# Patient Record
Sex: FEMALE | Race: WHITE | NOT HISPANIC OR LATINO
[De-identification: names, ages, dates, MRNs, and addresses within clinical notes are randomized per-mention and may not be internally consistent; named-entity substitution may affect disease eponyms.]

---

## 2017-11-08 ENCOUNTER — APPOINTMENT (OUTPATIENT)
Dept: OPHTHALMOLOGY | Facility: CLINIC | Age: 82
End: 2017-11-08
Payer: MEDICARE

## 2017-11-08 PROCEDURE — 92014 COMPRE OPH EXAM EST PT 1/>: CPT

## 2018-11-07 ENCOUNTER — APPOINTMENT (OUTPATIENT)
Dept: OPHTHALMOLOGY | Facility: CLINIC | Age: 83
End: 2018-11-07
Payer: MEDICARE

## 2018-11-07 DIAGNOSIS — Z96.1 PRESENCE OF INTRAOCULAR LENS: ICD-10-CM

## 2018-11-07 DIAGNOSIS — H43.393 OTHER VITREOUS OPACITIES, BILATERAL: ICD-10-CM

## 2018-11-07 DIAGNOSIS — Z98.890 OTHER SPECIFIED POSTPROCEDURAL STATES: ICD-10-CM

## 2018-11-07 DIAGNOSIS — H26.493 OTHER SECONDARY CATARACT, BILATERAL: ICD-10-CM

## 2018-11-07 PROCEDURE — 92014 COMPRE OPH EXAM EST PT 1/>: CPT

## 2018-11-27 ENCOUNTER — TRANSCRIPTION ENCOUNTER (OUTPATIENT)
Age: 83
End: 2018-11-27

## 2018-11-27 ENCOUNTER — INPATIENT (INPATIENT)
Facility: HOSPITAL | Age: 83
LOS: 0 days | Discharge: HOME HEALTH | DRG: 605 | End: 2018-11-27
Attending: STUDENT IN AN ORGANIZED HEALTH CARE EDUCATION/TRAINING PROGRAM | Admitting: STUDENT IN AN ORGANIZED HEALTH CARE EDUCATION/TRAINING PROGRAM
Payer: COMMERCIAL

## 2018-11-27 VITALS
DIASTOLIC BLOOD PRESSURE: 60 MMHG | HEART RATE: 96 BPM | TEMPERATURE: 99 F | RESPIRATION RATE: 18 BRPM | SYSTOLIC BLOOD PRESSURE: 121 MMHG | OXYGEN SATURATION: 98 %

## 2018-11-27 VITALS
TEMPERATURE: 98 F | RESPIRATION RATE: 18 BRPM | HEART RATE: 64 BPM | WEIGHT: 119.93 LBS | OXYGEN SATURATION: 98 % | SYSTOLIC BLOOD PRESSURE: 129 MMHG | DIASTOLIC BLOOD PRESSURE: 60 MMHG

## 2018-11-27 DIAGNOSIS — E03.9 HYPOTHYROIDISM, UNSPECIFIED: ICD-10-CM

## 2018-11-27 DIAGNOSIS — Z91.89 OTHER SPECIFIED PERSONAL RISK FACTORS, NOT ELSEWHERE CLASSIFIED: ICD-10-CM

## 2018-11-27 DIAGNOSIS — D72.829 ELEVATED WHITE BLOOD CELL COUNT, UNSPECIFIED: ICD-10-CM
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
## 2018-11-27 DIAGNOSIS — R63.8 OTHER SYMPTOMS AND SIGNS CONCERNING FOOD AND FLUID INTAKE: ICD-10-CM

## 2018-11-27 DIAGNOSIS — Z29.9 ENCOUNTER FOR PROPHYLACTIC MEASURES, UNSPECIFIED: ICD-10-CM

## 2018-11-27 DIAGNOSIS — I10 ESSENTIAL (PRIMARY) HYPERTENSION: ICD-10-CM

## 2018-11-27 LAB
ALBUMIN SERPL ELPH-MCNC: 3.7 G/DL — SIGNIFICANT CHANGE UP (ref 3.3–5)
ALP SERPL-CCNC: 86 U/L — SIGNIFICANT CHANGE UP (ref 40–120)
ALT FLD-CCNC: 17 U/L — SIGNIFICANT CHANGE UP (ref 10–45)
ANION GAP SERPL CALC-SCNC: 9 MMOL/L — SIGNIFICANT CHANGE UP (ref 5–17)
APPEARANCE UR: CLEAR — SIGNIFICANT CHANGE UP
AST SERPL-CCNC: 22 U/L — SIGNIFICANT CHANGE UP (ref 10–40)
BASOPHILS NFR BLD AUTO: 0.3 % — SIGNIFICANT CHANGE UP (ref 0–2)
BILIRUB SERPL-MCNC: 0.3 MG/DL — SIGNIFICANT CHANGE UP (ref 0.2–1.2)
BILIRUB UR-MCNC: NEGATIVE — SIGNIFICANT CHANGE UP
BUN SERPL-MCNC: 12 MG/DL — SIGNIFICANT CHANGE UP (ref 7–23)
CALCIUM SERPL-MCNC: 10.3 MG/DL — SIGNIFICANT CHANGE UP (ref 8.4–10.5)
CHLORIDE SERPL-SCNC: 101 MMOL/L — SIGNIFICANT CHANGE UP (ref 96–108)
CO2 SERPL-SCNC: 30 MMOL/L — SIGNIFICANT CHANGE UP (ref 22–31)
COLOR SPEC: YELLOW — SIGNIFICANT CHANGE UP
CREAT SERPL-MCNC: 0.7 MG/DL — SIGNIFICANT CHANGE UP (ref 0.5–1.3)
DIFF PNL FLD: NEGATIVE — SIGNIFICANT CHANGE UP
EOSINOPHIL NFR BLD AUTO: 0.8 % — SIGNIFICANT CHANGE UP (ref 0–6)
GLUCOSE SERPL-MCNC: 119 MG/DL — HIGH (ref 70–99)
GLUCOSE UR QL: NEGATIVE — SIGNIFICANT CHANGE UP
HCT VFR BLD CALC: 39.2 % — SIGNIFICANT CHANGE UP (ref 34.5–45)
HGB BLD-MCNC: 12.7 G/DL — SIGNIFICANT CHANGE UP (ref 11.5–15.5)
KETONES UR-MCNC: NEGATIVE — SIGNIFICANT CHANGE UP
LACTATE SERPL-SCNC: 1.8 MMOL/L — SIGNIFICANT CHANGE UP (ref 0.5–2)
LEUKOCYTE ESTERASE UR-ACNC: ABNORMAL
LYMPHOCYTES # BLD AUTO: 8.3 % — LOW (ref 13–44)
MCHC RBC-ENTMCNC: 28.8 PG — SIGNIFICANT CHANGE UP (ref 27–34)
MCHC RBC-ENTMCNC: 32.4 G/DL — SIGNIFICANT CHANGE UP (ref 32–36)
MCV RBC AUTO: 88.9 FL — SIGNIFICANT CHANGE UP (ref 80–100)
MONOCYTES NFR BLD AUTO: 7.2 % — SIGNIFICANT CHANGE UP (ref 2–14)
NEUTROPHILS NFR BLD AUTO: 83.4 % — HIGH (ref 43–77)
NITRITE UR-MCNC: NEGATIVE — SIGNIFICANT CHANGE UP
PH UR: 7.5 — SIGNIFICANT CHANGE UP (ref 5–8)
PLATELET # BLD AUTO: 251 K/UL — SIGNIFICANT CHANGE UP (ref 150–400)
POTASSIUM SERPL-MCNC: 4.9 MMOL/L — SIGNIFICANT CHANGE UP (ref 3.5–5.3)
POTASSIUM SERPL-SCNC: 4.9 MMOL/L — SIGNIFICANT CHANGE UP (ref 3.5–5.3)
PROT SERPL-MCNC: 7.4 G/DL — SIGNIFICANT CHANGE UP (ref 6–8.3)
PROT UR-MCNC: NEGATIVE MG/DL — SIGNIFICANT CHANGE UP
RBC # BLD: 4.41 M/UL — SIGNIFICANT CHANGE UP (ref 3.8–5.2)
RBC # FLD: 15 % — SIGNIFICANT CHANGE UP (ref 10.3–16.9)
SODIUM SERPL-SCNC: 140 MMOL/L — SIGNIFICANT CHANGE UP (ref 135–145)
SP GR SPEC: 1.01 — SIGNIFICANT CHANGE UP (ref 1–1.03)
TSH SERPL-MCNC: 1.07 UIU/ML — SIGNIFICANT CHANGE UP (ref 0.35–4.94)
UROBILINOGEN FLD QL: 0.2 E.U./DL — SIGNIFICANT CHANGE UP
WBC # BLD: 14.9 K/UL — HIGH (ref 3.8–10.5)
WBC # FLD AUTO: 14.9 K/UL — HIGH (ref 3.8–10.5)

## 2018-11-27 PROCEDURE — 84443 ASSAY THYROID STIM HORMONE: CPT

## 2018-11-27 PROCEDURE — 83605 ASSAY OF LACTIC ACID: CPT

## 2018-11-27 PROCEDURE — 99285 EMERGENCY DEPT VISIT HI MDM: CPT | Mod: 25

## 2018-11-27 PROCEDURE — 87086 URINE CULTURE/COLONY COUNT: CPT

## 2018-11-27 PROCEDURE — 36415 COLL VENOUS BLD VENIPUNCTURE: CPT

## 2018-11-27 PROCEDURE — 71045 X-RAY EXAM CHEST 1 VIEW: CPT

## 2018-11-27 PROCEDURE — 70450 CT HEAD/BRAIN W/O DYE: CPT | Mod: 26

## 2018-11-27 PROCEDURE — 71045 X-RAY EXAM CHEST 1 VIEW: CPT | Mod: 26

## 2018-11-27 PROCEDURE — 12002 RPR S/N/AX/GEN/TRNK2.6-7.5CM: CPT

## 2018-11-27 PROCEDURE — 70450 CT HEAD/BRAIN W/O DYE: CPT

## 2018-11-27 PROCEDURE — 99222 1ST HOSP IP/OBS MODERATE 55: CPT | Mod: GC,AI

## 2018-11-27 PROCEDURE — 72125 CT NECK SPINE W/O DYE: CPT | Mod: 26

## 2018-11-27 PROCEDURE — 81001 URINALYSIS AUTO W/SCOPE: CPT

## 2018-11-27 PROCEDURE — 87040 BLOOD CULTURE FOR BACTERIA: CPT

## 2018-11-27 PROCEDURE — 80053 COMPREHEN METABOLIC PANEL: CPT

## 2018-11-27 PROCEDURE — 72125 CT NECK SPINE W/O DYE: CPT

## 2018-11-27 PROCEDURE — 96374 THER/PROPH/DIAG INJ IV PUSH: CPT | Mod: XU

## 2018-11-27 PROCEDURE — 85025 COMPLETE CBC W/AUTO DIFF WBC: CPT

## 2018-11-27 RX ORDER — GABAPENTIN 400 MG/1
1 CAPSULE ORAL
Qty: 0 | Refills: 0 | COMMUNITY

## 2018-11-27 RX ORDER — LEVOTHYROXINE SODIUM 125 MCG
1 TABLET ORAL
Qty: 0 | Refills: 0 | COMMUNITY

## 2018-11-27 RX ORDER — ACETAMINOPHEN 500 MG
650 TABLET ORAL ONCE
Qty: 0 | Refills: 0 | Status: COMPLETED | OUTPATIENT
Start: 2018-11-27 | End: 2018-11-27

## 2018-11-27 RX ORDER — CEFTRIAXONE 500 MG/1
1 INJECTION, POWDER, FOR SOLUTION INTRAMUSCULAR; INTRAVENOUS ONCE
Qty: 0 | Refills: 0 | Status: COMPLETED | OUTPATIENT
Start: 2018-11-27 | End: 2018-11-27

## 2018-11-27 RX ORDER — FLUTICASONE PROPIONATE AND SALMETEROL 50; 250 UG/1; UG/1
1 POWDER ORAL; RESPIRATORY (INHALATION)
Qty: 0 | Refills: 0 | COMMUNITY

## 2018-11-27 RX ORDER — AMLODIPINE BESYLATE 2.5 MG/1
1 TABLET ORAL
Qty: 0 | Refills: 0 | COMMUNITY

## 2018-11-27 RX ORDER — ZOLPIDEM TARTRATE 10 MG/1
1 TABLET ORAL
Qty: 0 | Refills: 0 | COMMUNITY

## 2018-11-27 RX ORDER — METOPROLOL TARTRATE 50 MG
1 TABLET ORAL
Qty: 0 | Refills: 0 | COMMUNITY

## 2018-11-27 RX ORDER — ATORVASTATIN CALCIUM 80 MG/1
0 TABLET, FILM COATED ORAL
Qty: 0 | Refills: 0 | COMMUNITY

## 2018-11-27 RX ORDER — ASPIRIN/CALCIUM CARB/MAGNESIUM 324 MG
1 TABLET ORAL
Qty: 0 | Refills: 0 | COMMUNITY

## 2018-11-27 RX ORDER — ATORVASTATIN CALCIUM 80 MG/1
20 TABLET, FILM COATED ORAL AT BEDTIME
Qty: 0 | Refills: 0 | Status: DISCONTINUED | OUTPATIENT
Start: 2018-11-27 | End: 2018-11-27

## 2018-11-27 RX ORDER — FUROSEMIDE 40 MG
0 TABLET ORAL
Qty: 0 | Refills: 0 | COMMUNITY

## 2018-11-27 RX ORDER — GABAPENTIN 400 MG/1
0 CAPSULE ORAL
Qty: 0 | Refills: 0 | COMMUNITY

## 2018-11-27 RX ORDER — RAMIPRIL 5 MG
1 CAPSULE ORAL
Qty: 0 | Refills: 0 | COMMUNITY

## 2018-11-27 RX ORDER — ALENDRONATE SODIUM 70 MG/1
1 TABLET ORAL
Qty: 0 | Refills: 0 | COMMUNITY

## 2018-11-27 RX ORDER — LEVOTHYROXINE SODIUM 125 MCG
0 TABLET ORAL
Qty: 0 | Refills: 0 | COMMUNITY

## 2018-11-27 RX ORDER — LEVOTHYROXINE SODIUM 125 MCG
75 TABLET ORAL DAILY
Qty: 0 | Refills: 0 | Status: DISCONTINUED | OUTPATIENT
Start: 2018-11-27 | End: 2018-11-27

## 2018-11-27 RX ORDER — FUROSEMIDE 40 MG
1 TABLET ORAL
Qty: 0 | Refills: 0 | COMMUNITY

## 2018-11-27 RX ORDER — BECLOMETHASONE DIPROPIONATE 42 MCG
2 AEROSOL, SPRAY (GRAM) NASAL
Qty: 0 | Refills: 0 | COMMUNITY

## 2018-11-27 RX ORDER — ATORVASTATIN CALCIUM 80 MG/1
1 TABLET, FILM COATED ORAL
Qty: 0 | Refills: 0 | COMMUNITY

## 2018-11-27 RX ADMIN — Medication 650 MILLIGRAM(S): at 13:53

## 2018-11-27 RX ADMIN — Medication 75 MICROGRAM(S): at 10:35

## 2018-11-27 RX ADMIN — Medication 650 MILLIGRAM(S): at 13:02

## 2018-11-27 RX ADMIN — CEFTRIAXONE 100 GRAM(S): 500 INJECTION, POWDER, FOR SOLUTION INTRAMUSCULAR; INTRAVENOUS at 06:17

## 2018-11-27 NOTE — ED PROVIDER NOTE - OBJECTIVE STATEMENT
89F with hx of htn, asthma, hypothyroidism fell out of bed and does not remember. 89F with hx of htn, asthma, hypothyroidism fell out of bed and does not remember why. Pt lives at home alone, hit her own life alert, unk if LOC but pt does not recall. 89F with hx of htn, asthma, hypothyroidism fell out of bed and does not remember why. Pt lives at home alone, hit her own life alert, unk if LOC but pt does not recall. Pt lives alone has life alert nonambulatory gets from bed to commode by herself in the middle of the night but cannot recall why she fell.

## 2018-11-27 NOTE — DISCHARGE NOTE ADULT - SECONDARY DIAGNOSIS.
HTN (hypertension) Leukocytosis Hypothyroidism, unspecified type Osteoporosis Asthma Transition of care performed with sharing of clinical summary

## 2018-11-27 NOTE — H&P ADULT - PROBLEM SELECTOR PLAN 3
On Toprol XL 50mg and Ramipril 10mg at home On Toprol XL 50mg and Ramipril 10mg at home  -Currently normotensive, will restart as necessary. Synthroid 75mcg at [facility]  -C/w home dose  -f/u TSH

## 2018-11-27 NOTE — H&P ADULT - NSHPREVIEWOFSYSTEMS_GEN_ALL_CORE
REVIEW OF SYSTEMS:    CONSTITUTIONAL: No fevers or chills  EYES/ENT: No visual or hearing changes; no throat pain or difficulty swallowing  NECK: No pain or stiffness  RESPIRATORY: No cough, wheezing; No shortness of breath  CARDIOVASCULAR: No chest pain or palpitations  GASTROINTESTINAL: No abdominal pain. No nausea, vomiting; No diarrhea or constipation.  GENITOURINARY: No dysuria, frequency or hematuria  NEUROLOGICAL: No numbness or weakness  SKIN: No itching, burning, rashes, or lesions   All other review of systems is negative unless indicated above.

## 2018-11-27 NOTE — H&P ADULT - ATTENDING COMMENTS
Pt. seen and examined by me earlier today; I have read Dr. Acosta's H&P, I agree w/ her findings and plan of care as documented; CT head reviewed; Pt. feels well, A&Ox3 in NAD, neuro exam non-focal, eating lunch at time of my visit; leukocytosis noted, likely d/t fall, no e/o acute bacterial infection; I-STOP reviewed, Pt. on Ambien, would continue w/ caution/supervision; Pt. medically-clear for d/c home w/ home care services -- see Case Mgmt notes; outpatient f/u arranged w/ Dr. Ferrari on 12/2 for staple removal

## 2018-11-27 NOTE — DISCHARGE NOTE ADULT - CARE PLAN
Principal Discharge DX:	Fall, initial encounter  Goal:	Improvement of symptoms  Assessment and plan of treatment:	You were brought into the hospital after a fall at home. You had a laceration on your scalp that was repaired with 3 staples. Please go to Dr. Ferrari on 12/4 for staple removal.   You had a head CT that did not show any abnormalities.   You were seen by PT who recommended rehab, however you prefer going home with  care and weekly PT.  Secondary Diagnosis:	HTN (hypertension)  Assessment and plan of treatment:	Preexisting condition. Continue home medications  Secondary Diagnosis:	Leukocytosis  Secondary Diagnosis:	Hypothyroidism, unspecified type  Assessment and plan of treatment:	Preexisting condition. Continue home medications. TSH was within normal limits here.  Secondary Diagnosis:	Osteoporosis  Assessment and plan of treatment:	Preexisting condition. Continue home medications  Secondary Diagnosis:	Asthma  Assessment and plan of treatment:	Preexisting condition. Continue home medications  Secondary Diagnosis:	Transition of care performed with sharing of clinical summary

## 2018-11-27 NOTE — H&P ADULT - ASSESSMENT
89 yo woman with h/o (obtained from cardiologist) HTN, HLD, hypothyroid, asthma, chronic lymphedema, bedbound for years, who presents s/p fall. Admitted to medicine for further management.

## 2018-11-27 NOTE — H&P ADULT - PROBLEM SELECTOR PROBLEM 4
Transition of care performed with sharing of clinical summary Nutrition, metabolism, and development symptoms HTN (hypertension)

## 2018-11-27 NOTE — DISCHARGE NOTE ADULT - PATIENT PORTAL LINK FT
You can access the [facility] Patient Portal, offered by [facility], by registering with the following website: [email or web address]

## 2018-11-27 NOTE — H&P ADULT - NSHPPHYSICALEXAM_GEN_ALL_CORE
.  VITAL SIGNS:  T(C): 36.7 (11-27-18 @ 10:23), Max: 36.9 (11-27-18 @ 07:19)  T(F): 98.1 (11-27-18 @ 10:23), Max: 98.5 (11-27-18 @ 07:19)  HR: 81 (11-27-18 @ 10:23) (64 - 81)  BP: 146/65 (11-27-18 @ 10:23) (129/60 - 146/65)  BP(mean): --  RR: 19 (11-27-18 @ 10:23) (18 - 19)  SpO2: 95% (11-27-18 @ 10:23) (94% - 98%)  Wt(kg): --    PHYSICAL EXAM:    Constitutional: WDWN resting comfortably in bed; NAD  Head: midline head laceration approx 2cm in length; s/p staples; dry without active bleeding or edema  Eyes: PERRL, EOMI, clear conjunctiva  ENT: no nasal discharge; uvula midline, no oropharyngeal erythema or exudates; MMM  Neck: supple;   Respiratory: CTA B/L; no W/R/R, no retractions  Cardiac: +S1/S2; RRR; no M/R/G; PMI non-displaced  Gastrointestinal: soft, NT/ND; no rebound or guarding; +BSx4  Extremities: WWP, no clubbing or cyanosis; diffuse, non-pitting peripheral edema of paige LE's  Musculoskeletal: NROM of UE paige, pt required coaxing to move LE's paige. Able to range same (knee flexion, hip external/internal rotation, leg elevation limited minimal anti-gravity paige); no pain during movement.   Vascular: 2+ radial B/L, DP thready pulses B/L  Dermatologic: lentignes noted on arms and chest, skin warm, dry and intact of UE paige, LE with extensive patches of hemosiderin deposits along anterior and medial shins  R foot with dark discoloration/fuzzy appearance between 1st and 2nd digit and in flexors of 3rd and 4th digits.   Lymphatic: no submandibular or cervical LAD  Neurologic: AAOx3; Delayed and immediate recall adequate for biographical info and current events, with mild processing delay noted. Naming intact for low/high freqency items. No aphasia. CNII-XII grossly intact; no focal deficits.  Psychiatric: affect and characteristics of appearance, verbalizations, behaviors are appropriate

## 2018-11-27 NOTE — H&P ADULT - HISTORY OF PRESENT ILLNESS
89 yo woman with h/o (obtained from cardiologist) HTN, HLD, hypothyroid, asthma, chronic lymphedema, bedbound for years, who presents s/p fall. Per pt, she was asleep and awoke on the floor with visible blood around her. She did not recall how long she was on the floor but when she was alert/able she pressed her Life Line alert. Per daughter, the alert was triggered approx 12:30am. She has a remote h/o fall but without residual deficits.     At baseline, she is bedbound. Per pt and daughter, there is no neurological deficit. She is able to trf herself from bed to commode, otherwise full assist for all ADL;s. It appears from description, pt has been deconditioned for years, possibly related to lymphedema. Pt manages medications and finances indep. (daughter recently added to bank account). She has a private HHA and zPT (x1 week).     In ED VS T 97.6 F /60 HR 64 RR 18 98% RA. Labs remarkable for WBC 14.2 neurtrophil dominance. UA dirty catch but with bacteria present. CXR without acute changes. HDCT with artifact in pontine region; no acute hemorrhage. ED gave 1g CTX 89 yo woman with h/o (obtained from cardiologist) HTN, HLD, hypothyroid, asthma, chronic lymphedema, bedbound for years, who presents s/p fall. Per pt, she was asleep and awoke on the floor with visible blood around her. She did not recall how long she was on the floor but when she was alert/able she pressed her Life Line alert. She did hit her head, but denied incontinence/shaking/tongue biting/fever/chills. Per daughter, the alert was triggered approx 12:30am. She has a remote h/o fall but without residual deficits.     At baseline, she is bedbound. Per pt and daughter, there is no neurological deficit. She is able to trf herself from bed to commode, otherwise full assist for all ADL;s. It appears from description, pt has been deconditioned for years, possibly related to lymphedema. Pt manages medications and finances indep. (daughter recently added to bank account). She has a private HHA and zPT (x1 week).     In ED VS T 97.6 F /60 HR 64 RR 18 98% RA. Labs remarkable for WBC 14.2 neurtrophil dominance. UA dirty catch but with bacteria present. CXR without acute changes. HDCT with artifact in pontine region; no acute hemorrhage. ED gave 1g CTX

## 2018-11-27 NOTE — H&P ADULT - PROBLEM SELECTOR PLAN 2
Synthroid 75mcg at [facility]  -C/w home dose  -f/u TSH Pt with leukocytosis with neutrophil dominance. Can be stress response, s/p fall for unknown time on ground. Low suspicion for active infection.  -s/p 1g CTX in ED for possible UTI. Pt asymptomatic and u/a not clean catch. stopped further treatment.

## 2018-11-27 NOTE — DISCHARGE NOTE ADULT - HOSPITAL COURSE
89 yo woman with h/o (obtained from cardiologist) HTN, HLD, hypothyroid, asthma, chronic lymphedema, bedbound for years, who presents s/p fall. Per pt, she was asleep and awoke on the floor with visible blood around her. She did not recall how long she was on the floor but when she was alert/able she pressed her Life Line alert. She did hit her head, but denied incontinence/shaking/tongue biting/fever/chills. Per daughter, the alert was triggered approx 12:30am. She has a remote h/o fall but without residual deficits. In ED her VSS. Head laceration closed with staples, dry and non-bleeding thereafter HDCT completed showed no acute bleed. Etiology of fall unclear, as no sign of infection, volume status change, or metabolic derangement. However,  may be related to ambien used for sleep. Recommend discussing use of same with PCP. Initial suspicion of UTI in ED as pt with leukocytosis and bacteria in urine; s/p 1g Ceftriaxone. However, low suspicion for UTI as pt asymptomatic with <5 bacteria in UA. PT evaluated pt and recommended 24 hour care vs. ALOK. Pt (who is competent) preferred to go home. HHA hours will be increased (currently 9-2pm) and family/friends will cover additional time. Pt also has a private PT who will increase hours as well (currently seen 1x week).

## 2018-11-27 NOTE — PHYSICAL THERAPY INITIAL EVALUATION ADULT - ACTIVE RANGE OF MOTION EXAMINATION, REHAB EVAL
bilateral upper extremity Active ROM was WFL (within functional limits)/except right shoulder flex NT as pt guarded from old injury and left shoulder flex <50 degrees/bilateral  lower extremity Active ROM was WFL (within functional limits)

## 2018-11-27 NOTE — H&P ADULT - PROBLEM SELECTOR PLAN 7
1) PCP Contacted on Admission: (Y/N) --> Name & Phone #: TANIA, Dr. Nieca Goldberg  2) Date of Contact with PCP: 11/27/18  3) PCP Contacted at Discharge: (Y/N)  4) Summary of Handoff Given to PCP:   5) Post-Discharge Appointment Date and Location:

## 2018-11-27 NOTE — PHYSICAL THERAPY INITIAL EVALUATION ADULT - MANUAL MUSCLE TESTING RESULTS, REHAB EVAL
bilat LE >3/5 except Left knee exten 3+/5; Bilat UE >3/5 except right shoulder flex nt and left shoulder flex 3-/5

## 2018-11-27 NOTE — PHYSICAL THERAPY INITIAL EVALUATION ADULT - PERTINENT HX OF CURRENT PROBLEM, REHAB EVAL
89 yo F as per chart presents s/p fall. Per pt, she was asleep and awoke on the floor with visible blood around her. She did not recall how long she was on the floor but when she was alert/able she pressed her Life Line alert. She did hit her head,

## 2018-11-27 NOTE — PHYSICAL THERAPY INITIAL EVALUATION ADULT - ADDITIONAL COMMENTS
Pt lives at home alone has 9am-2pm x 7 days total. though interested in increased help around the clock and states will hire as per family. Pt has elevator access, no MATTHEW. PTA: bed<-> w/c transfer with supervision though requires assist as pt deconditioned. Has w/c at home.

## 2018-11-27 NOTE — H&P ADULT - PROBLEM SELECTOR PLAN 1
Unclear Unclear, possibly medication related, as pt on ambien at home and gabapentin. HDCT reassure for no acute bleed. Low suspicion for infection/UTI, not likely etiology. Pt desires to go home with more home care. Will await PT input to ensure a safe d/c.   -PT eval  -Social work  -Hold home ambien and gabapentin Unclear, possibly medication related, as pt on ambien at home and gabapentin. HDCT reassure for no acute bleed. Low suspicion for infection/UTI, not likely etiology. Pt desires to go home with more home care. Will await PT input to ensure a safe d/c.   -PT eval  -Social work  -Hold home ambien while inpatient, c/w as outpatient per PCP.

## 2018-11-27 NOTE — PHYSICAL THERAPY INITIAL EVALUATION ADULT - PASSIVE RANGE OF MOTION EXAMINATION, REHAB EVAL
bilateral upper extremity Passive ROM was WFL (within functional limits)/bilateral lower extremity Passive ROM was WFL (within functional limits)/except right shoulder flex nt as pt guarded from old injury; left shoulder flex ~80 degrees

## 2018-11-27 NOTE — PHYSICAL THERAPY INITIAL EVALUATION ADULT - DISCHARGE DISPOSITION, PT EVAL
ROOM # [id number]    Living Situation (if not independent, order SW consult): home with wife  Facility name:  : Isa (wife) [phone]    Activity level at baseline: independent  Activity level on admit: independent    Pt transferred to bed independently. Steady gait. Complains of nausea and pain.    Patient registered to observation; given Patient Bill of Rights; given the opportunity to ask questions about observation status and their plan of care.  Patient has been oriented to the observation room, bathroom and call light is in place.    Discussed discharge goals and expectations with patient/family.            home w/ home PT/with supervision/assist 24hrs/7days (family states will private hire)

## 2018-11-27 NOTE — ED PROVIDER NOTE - MEDICAL DECISION MAKING DETAILS
Pt with fall out of bed unclear cause, nonambulatory at baseline but transfers from commode to bed on her own. pt with head laceration, unclear cause, doubt syncope, NSR no concerning st/t changes, no arrhythmia on ekg. UA neg, mild elevation in wbc count lac repaired w/o complication ct head cervical spine neg acute. plan for admission to medicine for PT and home health aide/safety eval

## 2018-11-27 NOTE — H&P ADULT - NSHPLABSRESULTS_GEN_ALL_CORE
.  LABS:                         12.7   14.9  )-----------( 251      ( 2018 03:22 )             39.2         140  |  101  |  12  ----------------------------<  119<H>  4.9   |  30  |  0.70    Ca    10.3      2018 03:22    TPro  7.4  /  Alb  3.7  /  TBili  0.3  /  DBili  x   /  AST  22  /  ALT  17  /  AlkPhos  86        Urinalysis Basic - ( 2018 05:38 )    Color: Yellow / Appearance: Clear / S.010 / pH: x  Gluc: x / Ketone: NEGATIVE  / Bili: Negative / Urobili: 0.2 E.U./dL   Blood: x / Protein: NEGATIVE mg/dL / Nitrite: NEGATIVE   Leuk Esterase: Moderate / RBC: < 5 /HPF / WBC < 5 /HPF   Sq Epi: x / Non Sq Epi: Moderate /HPF / Bacteria: Present /HPF            Lactate, Blood: 1.8 mmoL/L ( @ 05:58)      RADIOLOGY, EKG & ADDITIONAL TESTS: Reviewed.   EKG: NSR  < from: CT Head No Cont (18 @ 04:51)   IMPRESSION:   No definitive acuteintracranial hemorrhage or calvarial fracture. High   density in the right naida is felt more likely to represent streak   artifact than acute hemorrhage or neoplasm.    GINA LAMBERT M.D., RADIOLOGY RESIDENT  This document has been electronically signed.  ABDIRIZAK STERN M.D., ATTENDING RADIOLOGIST  This document has been electronically signed. 2018  9:26AM      CT Cervical Spine No Cont (18 @ 04:51)   IMPRESSION:   No cervical spine fracture or dislocation. Advanced cervical spondylosis.  ******PRELIMINARY REPORT******    ******PRELIMINARY REPORT******          GINA LAMBERT M.D., RADIOLOGY RESIDENT .  LABS:                         12.7   14.9  )-----------( 251      ( 2018 03:22 )             39.2         140  |  101  |  12  ----------------------------<  119<H>  4.9   |  30  |  0.70    Ca    10.3      2018 03:22    TPro  7.4  /  Alb  3.7  /  TBili  0.3  /  DBili  x   /  AST  22  /  ALT  17  /  AlkPhos  86        Urinalysis Basic - ( 2018 05:38 )    Color: Yellow / Appearance: Clear / S.010 / pH: x  Gluc: x / Ketone: NEGATIVE  / Bili: Negative / Urobili: 0.2 E.U./dL   Blood: x / Protein: NEGATIVE mg/dL / Nitrite: NEGATIVE   Leuk Esterase: Moderate / RBC: < 5 /HPF / WBC < 5 /HPF   Sq Epi: x / Non Sq Epi: Moderate /HPF / Bacteria: Present /HPF            Lactate, Blood: 1.8 mmoL/L ( @ 05:58)      RADIOLOGY, EKG & ADDITIONAL TESTS: Reviewed.   EKG: NSR  < from: CT Head No Cont (18 @ 04:51)   IMPRESSION:   No definitive acuteintracranial hemorrhage or calvarial fracture. High   density in the right naida is felt more likely to represent streak   artifact than acute hemorrhage or neoplasm.    GINA LAMBERT M.D., RADIOLOGY RESIDENT  This document has been electronically signed.  ABDIRIZAK STERN M.D., ATTENDING RADIOLOGIST  This document has been electronically signed. 2018  9:26AM      CT Cervical Spine No Cont (18 @ 04:51)   IMPRESSION:   No cervical spine fracture or dislocation. Advanced cervical spondylosis.  ******PRELIMINARY REPORT******    ******PRELIMINARY REPORT******          GINA LAMBERT M.D., RADIOLOGY RESIDENT  < from: CT Head No Cont (18 @ 04:51) >    IMPRESSION:   No definitive acuteintracranial hemorrhage or calvarial fracture. High   density in the right naida is felt more likely to represent streak   artifact than acute hemorrhage or neoplasm.    < end of copied text >

## 2018-11-27 NOTE — DISCHARGE NOTE ADULT - PLAN OF CARE
Improvement of symptoms You were brought into the hospital after a fall at home. You had a laceration on your scalp that was repaired with 3 staples. Please go to Dr. Ferrari on 12/4 for staple removal.   You had a head CT that did not show any abnormalities.   You were seen by PT who recommended rehab, however you prefer going home with  care and weekly PT. Preexisting condition. Continue home medications Preexisting condition. Continue home medications. TSH was within normal limits here.

## 2018-11-27 NOTE — PHYSICAL THERAPY INITIAL EVALUATION ADULT - GENERAL OBSERVATIONS, REHAB EVAL
Rec'd pt supine in bed, in no acute distress, +heplock, cleared for PT and OOB activity by RN karyle.

## 2018-11-27 NOTE — DISCHARGE NOTE ADULT - CARE PROVIDER_API CALL
Teddy Ferrari), Internal Medicine  [address] [address]  Phone: [phone]  Fax: [phone]

## 2018-11-27 NOTE — H&P ADULT - PROBLEM SELECTOR PLAN 6
1) PCP Contacted on Admission: (Y/N) --> Name & Phone #: TANIA, Dr. Nieca Goldberg  2) Date of Contact with PCP: 11/27/18  3) PCP Contacted at Discharge: (Y/N)  4) Summary of Handoff Given to PCP:   5) Post-Discharge Appointment Date and Location: [facility] ppx Hep [facility]

## 2018-11-27 NOTE — DISCHARGE NOTE ADULT - MEDICATION SUMMARY - MEDICATIONS TO TAKE
I will START or STAY ON the medications listed below when I get home from the hospital:    aspirin 81 mg oral tablet  -- 1 tab(s) by mouth once a day  -- Indication: For Home medication     ramipril 10 mg oral tablet  -- 1 tab(s) by mouth once a day  -- Indication: For HTN (hypertension)    gabapentin 300 mg oral tablet  -- 1 tab(s) by mouth 2 times a day  -- Indication: For Pain     atorvastatin 20 mg oral tablet  -- 1 tab(s) by mouth once a day  -- Indication: For HLD    Toprol-XL 50 mg oral tablet, extended release  -- 1 tab(s) by mouth once a day  -- Indication: For HTN (hypertension)    Fosamax 70 mg oral tablet  -- 1 tab(s) by mouth once a week  -- Indication: For Osteoporosis    Advair Diskus 250 mcg-50 mcg inhalation powder  -- 1 puff(s) inhaled 2 times a day  -- Indication: For Asthma    Norvasc 5 mg oral tablet  -- 1 tab(s) by mouth 2 times a day  -- Indication: For HTN (hypertension)    triamcinolone 0.1% topical cream  -- Apply on skin to affected area 3 times a day  -- Indication: For RASH     Lasix 20 mg oral tablet  -- 1 tab(s) by mouth once a day  -- Indication: For chronic lymphedema     Qnasl 80 mcg/inh nasal spray  -- 2 spray(s) into nose once a day  -- Indication: For Nasal congestion     levothyroxine 75 mcg (0.075 mg) oral tablet  -- 1 tab(s) by mouth once a day  -- Indication: For Hypothyroidism, unspecified type

## 2018-11-27 NOTE — H&P ADULT - NSHPSOCIALHISTORY_GEN_ALL_CORE
Lives at home, with 2 home attendants from 9AM-2PM, otherwise alone. Can mobilize from bed to commode next to bed, otherwise bedbound as pt just hasn't walked over the past few years. Unclear cause, pt reports, she "just doesn't".

## 2018-11-27 NOTE — ED ADULT TRIAGE NOTE - CHIEF COMPLAINT QUOTE
s/p fell off the bed this morning sustained laceration to occipital area . LOC  unsure.  pt with confusion .

## 2018-11-27 NOTE — H&P ADULT - PROBLEM SELECTOR PLAN 4
1) PCP Contacted on Admission: (Y/N) --> Name & Phone #:  2) Date of Contact with PCP:  3) PCP Contacted at Discharge: (Y/N)  4) Summary of Handoff Given to PCP:   5) Post-Discharge Appointment Date and Location: 1) PCP Contacted on Admission: (Y/N) --> Name & Phone #: TANIA, Dr. Nieca Goldberg  2) Date of Contact with PCP: 11/27/18  3) PCP Contacted at Discharge: (Y/N)  4) Summary of Handoff Given to PCP:   5) Post-Discharge Appointment Date and Location: F: None  E; replete PRN  N: DASH/TLC diet. On Toprol XL 50mg and Ramipril 10mg at home  -Currently normotensive, will restart as necessary.

## 2018-11-28 LAB
CULTURE RESULTS: SIGNIFICANT CHANGE UP
SPECIMEN SOURCE: SIGNIFICANT CHANGE UP

## 2018-12-02 LAB
CULTURE RESULTS: SIGNIFICANT CHANGE UP
CULTURE RESULTS: SIGNIFICANT CHANGE UP
SPECIMEN SOURCE: SIGNIFICANT CHANGE UP
SPECIMEN SOURCE: SIGNIFICANT CHANGE UP

## 2018-12-04 DIAGNOSIS — J45.909 UNSPECIFIED ASTHMA, UNCOMPLICATED: ICD-10-CM

## 2018-12-04 DIAGNOSIS — S01.01XA LACERATION WITHOUT FOREIGN BODY OF SCALP, INITIAL ENCOUNTER: ICD-10-CM

## 2018-12-04 DIAGNOSIS — I10 ESSENTIAL (PRIMARY) HYPERTENSION: ICD-10-CM

## 2018-12-04 DIAGNOSIS — I89.0 LYMPHEDEMA, NOT ELSEWHERE CLASSIFIED: ICD-10-CM

## 2018-12-04 DIAGNOSIS — M81.0 AGE-RELATED OSTEOPOROSIS WITHOUT CURRENT PATHOLOGICAL FRACTURE: ICD-10-CM

## 2018-12-04 DIAGNOSIS — D72.829 ELEVATED WHITE BLOOD CELL COUNT, UNSPECIFIED: ICD-10-CM
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
## 2018-12-04 DIAGNOSIS — E03.9 HYPOTHYROIDISM, UNSPECIFIED: ICD-10-CM

## 2018-12-04 DIAGNOSIS — E78.5 HYPERLIPIDEMIA, UNSPECIFIED: ICD-10-CM
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
## 2019-01-08 NOTE — PATIENT PROFILE ADULT - FUNCTIONAL SCREEN CURRENT LEVEL: BATHING, MLM
Patient: Kymberly Everett    Procedure(s):  SPLIT THICKNESS SKIN GRAFT FROM LEFT THIGH TO LEFT MEDIAL ANKLE WITH WOUND VAC PLACEMENT  WOUND VAC PLACEMENT    Diagnosis: NON-HEALING LEFT MEDIAL ANKLE ULCER  Diagnosis Additional Information: No value filed.    Anesthesia Type:   General, LMA     Note:  Airway :Face Mask  Patient transferred to:PACU  Handoff Report: Identifed the Patient, Identified the Reponsible Provider, Reviewed the pertinent medical history, Discussed the surgical course, Reviewed Intra-OP anesthesia mangement and issues during anesthesia, Set expectations for post-procedure period and Allowed opportunity for questions and acknowledgement of understanding      Vitals: (Last set prior to Anesthesia Care Transfer)    CRNA VITALS  1/8/2019 1309 - 1/8/2019 1344      1/8/2019             Resp Rate (observed):  1  (Abnormal)     Resp Rate (set):  10                Electronically Signed By: OLIVIA Barbosa CRNA  January 8, 2019  1:44 PM   3 = assistive equipment and person

## 2019-11-13 ENCOUNTER — NON-APPOINTMENT (OUTPATIENT)
Age: 84
End: 2019-11-13

## 2019-11-13 ENCOUNTER — APPOINTMENT (OUTPATIENT)
Dept: OPHTHALMOLOGY | Facility: CLINIC | Age: 84
End: 2019-11-13
Payer: MEDICARE

## 2019-11-13 PROBLEM — I10 ESSENTIAL (PRIMARY) HYPERTENSION: Chronic | Status: ACTIVE | Noted: 2018-11-27

## 2019-11-13 PROBLEM — M81.0 AGE-RELATED OSTEOPOROSIS WITHOUT CURRENT PATHOLOGICAL FRACTURE: Chronic | Status: ACTIVE | Noted: 2018-11-27

## 2019-11-13 PROBLEM — J45.909 UNSPECIFIED ASTHMA, UNCOMPLICATED: Chronic | Status: ACTIVE | Noted: 2018-11-27

## 2019-11-13 PROBLEM — E07.9 DISORDER OF THYROID, UNSPECIFIED: Chronic | Status: ACTIVE | Noted: 2018-11-27

## 2019-11-13 PROCEDURE — 92012 INTRM OPH EXAM EST PATIENT: CPT

## 2020-11-18 ENCOUNTER — APPOINTMENT (OUTPATIENT)
Dept: OPHTHALMOLOGY | Facility: CLINIC | Age: 85
End: 2020-11-18

## 2021-03-17 NOTE — PATIENT PROFILE ADULT - PRIMARY ROLES/RESPONSIBILITIES
03/17/21 0837   Pain Assessment   Pain Assessment Tool 0-10   Pain Score No Pain   Restrictions/Precautions   Precautions Fall Risk   Braces or Orthoses Sling;AFO  (LUE/LLE)   Cognition   Overall Cognitive Status WFL   Orientation Level Oriented X4   Sit to Lying   Type of Assistance Needed Incidental touching   Comment cg with increased time; uses bed rail   Sit to Lying CARE Score 4   Lying to Sitting on Side of Bed   Type of Assistance Needed Incidental touching   Comment cg with increased time; uses bed rail   Lying to Sitting on Side of Bed CARE Score 4   Sit to Stand   Type of Assistance Needed Incidental touching   Comment cg   Sit to Stand CARE Score 4   Bed-Chair Transfer   Type of Assistance Needed Physical assistance   Amount of Physical Assistance Provided 25%-49%   Comment cg/min assist   Chair/Bed-to-Chair Transfer CARE Score 3   Walk 10 Feet   Type of Assistance Needed Physical assistance   Amount of Physical Assistance Provided 25%-49%   Comment cg/min assist   Walk 10 Feet CARE Score 3   Walk 50 Feet with Two Turns   Type of Assistance Needed Physical assistance   Amount of Physical Assistance Provided 25%-49%   Comment cg/min assist   Walk 50 Feet with Two Turns CARE Score 3   Ambulation   Does the patient walk? 2  Yes   Primary Mode of Locomotion Prior to Admission Walk   Distance Walked (feet) 119 ft  (747' 115' 113')   Assist Device Solo Walker   Gait Pattern Ataxic; Inconsistant Alyx; Slow Alyx;Decreased foot clearance;L foot drag;L hemiparesis; Narrow ERASMO;Step to   Limitations Noted In Balance; Endurance; Safety;Strength   Provided Assistance with: Balance   Walk Assist Level Contact Guard;Minimum Assist   Findings level surfaces   Wheel 50 Feet with Two Turns   Type of Assistance Needed Independent   Wheel 50 Feet with Two Turns CARE Score 6   Wheel 150 Feet   Type of Assistance Needed Independent   Wheel 150 Feet CARE Score 6   Wheelchair mobility   Does the patient use a wheelchair?  1  Yes   Type of Wheelchair Used 1  Manual   Method Right upper extremity;Right lower extremity   Assistance Provided For Locking Brakes;Obstacles   Distance Level Surface (feet) 191 ft  (191' 156')   Distance Wheeled 3% Grade 22 ft   Findings mod I level and unlevel surfaces   Curb or Single Stair   Style negotiated Single stair   Type of Assistance Needed Physical assistance   Amount of Physical Assistance Provided 25%-49%   Comment min assist   1 Step (Curb) CARE Score 3   4 Steps   Type of Assistance Needed Physical assistance   Amount of Physical Assistance Provided 25%-49%   Comment min assist   4 Steps CARE Score 3   Stairs   Type Stairs   # of Steps 6   Weight Bearing Precautions WBAT   Assist Devices Single Rail  (R side; L AFO LE)   Findings min assist going up forward and down backward   Toilet Transfer   Type of Assistance Needed Physical assistance   Amount of Physical Assistance Provided 25%-49%   Comment cg/min assist with grab bar   Toilet Transfer CARE Score 3   Therapeutic Interventions   Strengthening seated ther ex; AROM RLE, AAROM LLE   Balance gait and transfer training   Neuromuscular Re-Education E-stim L DF muscles with positive contractions x 15 minutes   Other w/c mobility; stairs   Assessment   Treatment Assessment Patient tolerated therapy well  Continues to show improvement with LLE ROM/strength as well as overall function  Tolerated E-stim to LLE with positive DF contractions as well as quad contractions  Completed ther ex for general LE strengthening; gait and transfer training focusing on sequence and technique for improved balance and safety with functional mobility  Patient MOD I wheelchair mobility  Patient able to negotiate steps with MIN A  Patient appropriate for concurrent therapy to increase motivation among pts with similar deficits while completing therapy session     PT Barriers   Physical Impairment Decreased strength;Decreased range of motion;Decreased endurance; Impaired balance;Decreased mobility; Decreased coordination;Decreased safety awareness   Functional Limitation Wheelchair management; [facility] negotiation   Plan   Treatment/Interventions Functional transfer training;LE strengthening/ROM; Elevations; Therapeutic exercise; Bed mobility;Gait training   Progress Progressing toward goals   PT Therapy Minutes   PT Time In 0837   PT Time Out 1010   PT Total Time (minutes) 93   PT Mode of treatment - Individual (minutes) 23   PT Mode of treatment - Concurrent (minutes) 70   PT Mode of treatment - Group (minutes) 0   PT Mode of treatment - Co-treat (minutes) 0   PT Mode of Treatment - Total time(minutes) 93 minutes   PT Cumulative Minutes 991   Therapy Time missed   Time missed?  No none

## 2023-05-16 NOTE — H&P ADULT - NSCORESITESY/N_GEN_A_CORE_RD
No [Good] : ~his/her~  mood as  good [No] : No [0] : 2) Feeling down, depressed, or hopeless: Not at all (0) [PHQ-2 Negative - No further assessment needed] : PHQ-2 Negative - No further assessment needed [I have developed a follow-up plan documented below in the note.] : I have developed a follow-up plan documented below in the note. [Patient reported mammogram was normal] : Patient reported mammogram was normal [Patient reported colonoscopy was normal] : Patient reported colonoscopy was normal [None] : None [With Family] : lives with family [] :  [Fully functional (bathing, dressing, toileting, transferring, walking, feeding)] : Fully functional (bathing, dressing, toileting, transferring, walking, feeding) [Fully functional (using the telephone, shopping, preparing meals, housekeeping, doing laundry, using] : Fully functional and needs no help or supervision to perform IADLs (using the telephone, shopping, preparing meals, housekeeping, doing laundry, using transportation, managing medications and managing finances) [With Patient/Caregiver] : , with patient/caregiver [Name: ___] : Health Care Proxy's Name: [unfilled]  [Relationship: ___] : Relationship: [unfilled] [I will adhere to the patient's wishes.] : I will adhere to the patient's wishes. [Time Spent: ___ minutes] : Time Spent: [unfilled] minutes [Never] : Never [[id number]] : 0 [Reports changes in hearing] : Reports no changes in hearing [Reports changes in vision] : Reports no changes in vision [Reports changes in dental health] : Reports no changes in dental health [MammogramDate] : 2021 [MammogramComments] : PER PT REF GIVEN [PapSmearDate] : NEVER [PapSmearComments] : NEVER, WILL COME TO DO ONE  [BoneDensityDate] : NEVER [BoneDensityComments] : REF GIVEN [ColonoscopyDate] : 2019 [ColonoscopyComments] : PER PT WNL, TO GO BACK IN 10 YRS [de-identified] : LAST EYE EXAM 2/2022 [de-identified] : NEVER [AdvancecareDate] : 05/2023 [FreeTextEntry4] : [phone]\[facility] [phone]

## 2025-01-23 NOTE — PATIENT PROFILE ADULT - HAVE YOU EXPERIENCED A TRAUMATIC EVENT?
[FreeTextEntry1] : GENERAL PHYSICAL EXAM: GEN: no distress, normal affect EYES: sclera white, conjunctiva clear, no nystagmus CV: normal rhythm PULM: no respiratory distress, normal rhythm and effort EXT: no edema, no cyanosis SKIN: warm, dry, no rash or lesion on exposed skin   NEUROLOGICAL EXAM: Mental Status Orientation: alert and oriented to person, place, time, and situation Language: clear and fluent, intact comprehension and repetition   Cranial Nerves II: visual fields full to confrontation III, IV, VI: PERRL, EOMI V, VII: facial sensation and movement intact and symmetric VIII: hearing intact IX, X: uvula midline, soft palate elevates normally XI: BL shoulder shrug intact XII: tongue midline   Motor Shoulder abd: 5 (R), 5 (L) EF/EE: 5 (R), 5 (L) WF/WE: 5 (R), 5 (L) hand : 5 (R), 5 (L) HF/HE: 5 (R), 5 (L) KF/KE: 5 (R), 5 (L) DF/PF: 5 (R), 5 (L) Tone and bulk are normal in upper and lower limbs No pronator drift   Sensation Intact to light touch in all 4 EXTs   Reflex 2+ in BL biceps, brachioradialis, patella   Coordination Normal FTN bilaterally Able to perform rapid, alternating movements   Gait Normal stance, stride, and pivot turn Tandem walk intact Negative Romberg  no